# Patient Record
Sex: MALE
[De-identification: names, ages, dates, MRNs, and addresses within clinical notes are randomized per-mention and may not be internally consistent; named-entity substitution may affect disease eponyms.]

---

## 2020-11-30 PROBLEM — Z00.00 ENCOUNTER FOR PREVENTIVE HEALTH EXAMINATION: Status: ACTIVE | Noted: 2020-11-30

## 2020-12-01 ENCOUNTER — APPOINTMENT (OUTPATIENT)
Dept: OTOLARYNGOLOGY | Facility: CLINIC | Age: 65
End: 2020-12-01
Payer: COMMERCIAL

## 2020-12-01 VITALS
WEIGHT: 225 LBS | SYSTOLIC BLOOD PRESSURE: 125 MMHG | DIASTOLIC BLOOD PRESSURE: 86 MMHG | BODY MASS INDEX: 32.21 KG/M2 | TEMPERATURE: 98 F | HEART RATE: 91 BPM | HEIGHT: 70 IN

## 2020-12-01 DIAGNOSIS — H61.23 IMPACTED CERUMEN, BILATERAL: ICD-10-CM

## 2020-12-01 DIAGNOSIS — Z78.9 OTHER SPECIFIED HEALTH STATUS: ICD-10-CM

## 2020-12-01 DIAGNOSIS — H92.01 OTALGIA, RIGHT EAR: ICD-10-CM

## 2020-12-01 DIAGNOSIS — Z85.79 PERSONAL HISTORY OF OTHER MALIGNANT NEOPLASMS OF LYMPHOID, HEMATOPOIETIC AND RELATED TISSUES: ICD-10-CM

## 2020-12-01 DIAGNOSIS — Z80.42 FAMILY HISTORY OF MALIGNANT NEOPLASM OF PROSTATE: ICD-10-CM

## 2020-12-01 PROCEDURE — 99203 OFFICE O/P NEW LOW 30 MIN: CPT

## 2020-12-01 PROCEDURE — 99072 ADDL SUPL MATRL&STAF TM PHE: CPT

## 2020-12-01 NOTE — HISTORY OF PRESENT ILLNESS
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

## 2020-12-01 NOTE — REVIEW OF SYSTEMS
[Sneezing] : sneezing [Seasonal Allergies] : seasonal allergies [Post Nasal Drip] : post nasal drip [Ear Pain] : ear pain [Ear Itch] : ear itch [Negative] : Heme/Lymph

## 2023-06-27 ENCOUNTER — OFFICE (OUTPATIENT)
Dept: URBAN - METROPOLITAN AREA CLINIC 102 | Facility: CLINIC | Age: 68
Setting detail: OPHTHALMOLOGY
End: 2023-06-27
Payer: COMMERCIAL

## 2023-06-27 DIAGNOSIS — H01.005: ICD-10-CM

## 2023-06-27 DIAGNOSIS — H02.401: ICD-10-CM

## 2023-06-27 DIAGNOSIS — H01.002: ICD-10-CM

## 2023-06-27 DIAGNOSIS — H43.392: ICD-10-CM

## 2023-06-27 DIAGNOSIS — H02.403: ICD-10-CM

## 2023-06-27 DIAGNOSIS — H25.13: ICD-10-CM

## 2023-06-27 DIAGNOSIS — H01.004: ICD-10-CM

## 2023-06-27 DIAGNOSIS — H01.001: ICD-10-CM

## 2023-06-27 DIAGNOSIS — H43.811: ICD-10-CM

## 2023-06-27 DIAGNOSIS — H02.402: ICD-10-CM

## 2023-06-27 PROCEDURE — 92014 COMPRE OPH EXAM EST PT 1/>: CPT | Performed by: OPHTHALMOLOGY

## 2023-06-27 ASSESSMENT — CONFRONTATIONAL VISUAL FIELD TEST (CVF)
OS_FINDINGS: FULL
OD_FINDINGS: FULL

## 2023-06-27 ASSESSMENT — REFRACTION_AUTOREFRACTION
OS_SPHERE: +0.25
OD_AXIS: 077
OS_AXIS: 178
OD_SPHERE: +0.25
OS_CYLINDER: -0.50
OD_CYLINDER: -1.25

## 2023-06-27 ASSESSMENT — REFRACTION_MANIFEST
OS_CYLINDER: SPH
OD_SPHERE: PLANO
OD_AXIS: 70
OD_VA1: 20/20
OS_SPHERE: +0.50
OD_CYLINDER: -0.25
OS_VA1: 20/20

## 2023-06-27 ASSESSMENT — LID POSITION - PTOSIS
OD_PTOSIS: RUL 2+ 3+
OS_PTOSIS: LUL 2+ 3+

## 2023-06-27 ASSESSMENT — REFRACTION_CURRENTRX
OS_SPHERE: +1.50
OD_SPHERE: +1.50
OS_OVR_VA: 20/
OD_OVR_VA: 20/

## 2023-06-27 ASSESSMENT — AXIALLENGTH_DERIVED
OS_AL: 23.3196
OD_AL: 23.4632

## 2023-06-27 ASSESSMENT — LID EXAM ASSESSMENTS
OD_BLEPHARITIS: 1+
OS_BLEPHARITIS: 1+

## 2023-06-27 ASSESSMENT — VISUAL ACUITY
OS_BCVA: 20/25-1
OD_BCVA: 20/20-1

## 2023-06-27 ASSESSMENT — KERATOMETRY
OD_AXISANGLE_DEGREES: 094
OD_K2POWER_DIOPTERS: 45.00
METHOD_AUTO_MANUAL: AUTO
OS_K1POWER_DIOPTERS: 43.50
OD_K1POWER_DIOPTERS: 43.50
OS_K2POWER_DIOPTERS: 45.00
OS_AXISANGLE_DEGREES: 089

## 2023-06-27 ASSESSMENT — TONOMETRY
OD_IOP_MMHG: 14
OS_IOP_MMHG: 15

## 2023-06-27 ASSESSMENT — SPHEQUIV_DERIVED
OD_SPHEQUIV: -0.375
OS_SPHEQUIV: 0

## 2024-07-01 ENCOUNTER — OFFICE (OUTPATIENT)
Dept: URBAN - METROPOLITAN AREA CLINIC 102 | Facility: CLINIC | Age: 69
Setting detail: OPHTHALMOLOGY
End: 2024-07-01
Payer: COMMERCIAL

## 2024-07-01 DIAGNOSIS — H43.392: ICD-10-CM

## 2024-07-01 DIAGNOSIS — H01.005: ICD-10-CM

## 2024-07-01 DIAGNOSIS — H25.13: ICD-10-CM

## 2024-07-01 DIAGNOSIS — H43.811: ICD-10-CM

## 2024-07-01 DIAGNOSIS — H01.001: ICD-10-CM

## 2024-07-01 DIAGNOSIS — H01.002: ICD-10-CM

## 2024-07-01 DIAGNOSIS — H01.004: ICD-10-CM

## 2024-07-01 DIAGNOSIS — H02.401: ICD-10-CM

## 2024-07-01 DIAGNOSIS — H02.402: ICD-10-CM

## 2024-07-01 DIAGNOSIS — H02.403: ICD-10-CM

## 2024-07-01 PROCEDURE — 92014 COMPRE OPH EXAM EST PT 1/>: CPT | Performed by: OPHTHALMOLOGY

## 2024-07-01 ASSESSMENT — LID EXAM ASSESSMENTS
OS_BLEPHARITIS: 1+
OD_BLEPHARITIS: 1+

## 2024-07-01 ASSESSMENT — LID POSITION - PTOSIS
OS_PTOSIS: LUL 2+ 3+
OD_PTOSIS: RUL 2+ 3+

## 2024-07-01 ASSESSMENT — CONFRONTATIONAL VISUAL FIELD TEST (CVF)
OD_FINDINGS: FULL
OS_FINDINGS: FULL